# Patient Record
Sex: MALE | URBAN - METROPOLITAN AREA
[De-identification: names, ages, dates, MRNs, and addresses within clinical notes are randomized per-mention and may not be internally consistent; named-entity substitution may affect disease eponyms.]

---

## 2020-06-26 ENCOUNTER — OFFICE VISIT (OUTPATIENT)
Dept: URBAN - METROPOLITAN AREA CLINIC 16 | Facility: CLINIC | Age: 40
End: 2020-06-26
Payer: COMMERCIAL

## 2020-06-26 DIAGNOSIS — H25.043 POSTERIOR SUBCAPSULAR POLAR AGE-RELATED CATARACT, BILATERAL: Primary | ICD-10-CM

## 2020-06-26 PROCEDURE — 99204 OFFICE O/P NEW MOD 45 MIN: CPT | Performed by: OPHTHALMOLOGY

## 2020-06-26 ASSESSMENT — VISUAL ACUITY
OD: 20/25
OS: 20/25

## 2020-06-26 ASSESSMENT — INTRAOCULAR PRESSURE
OD: 17
OS: 17

## 2020-06-26 ASSESSMENT — KERATOMETRY
OS: 44.88
OD: 44.75

## 2020-06-26 NOTE — IMPRESSION/PLAN
Impression: Posterior subcapsular polar age-related cataract, bilateral: H25.043. Alvenia Bash Visually significant, quality of life issue, could improve with surgery. Plan: Cataracts account for the patient's complaints. Discussed all risks, benefits, alternatives, procedures and recovery. Patient understands changing glasses will not improve vision. Patient desires to have surgery, recommend phacoemulsification with intraocular lens implant OS. lvl 2 - pt considering toric IOL -  Re-evaluate OD for possible cataract surgery after OS is done.

## 2023-09-22 ENCOUNTER — OFFICE VISIT (OUTPATIENT)
Dept: URBAN - METROPOLITAN AREA CLINIC 16 | Facility: CLINIC | Age: 43
End: 2023-09-22
Payer: COMMERCIAL

## 2023-09-22 DIAGNOSIS — H25.043 POSTERIOR SUBCAPSULAR POLAR AGE-RELATED CATARACT, BILATERAL: Primary | ICD-10-CM

## 2023-09-22 PROCEDURE — 99204 OFFICE O/P NEW MOD 45 MIN: CPT | Performed by: OPHTHALMOLOGY

## 2023-09-22 ASSESSMENT — VISUAL ACUITY
OS: 20/25
OD: 20/25

## 2023-09-22 ASSESSMENT — INTRAOCULAR PRESSURE
OD: 15
OS: 15

## 2023-10-25 ENCOUNTER — TECH ONLY (OUTPATIENT)
Dept: URBAN - METROPOLITAN AREA CLINIC 16 | Facility: CLINIC | Age: 43
End: 2023-10-25
Payer: COMMERCIAL

## 2023-10-25 DIAGNOSIS — H25.043 POSTERIOR SUBCAPSULAR POLAR AGE-RELATED CATARACT, BILATERAL: Primary | ICD-10-CM

## 2023-10-25 ASSESSMENT — PACHYMETRY
OD: 23.85
OD: 3.16
OS: 3.05
OS: 23.77

## 2023-10-30 ENCOUNTER — SURGERY (OUTPATIENT)
Dept: URBAN - METROPOLITAN AREA SURGERY 11 | Facility: SURGERY | Age: 43
End: 2023-10-30
Payer: COMMERCIAL

## 2023-10-30 PROCEDURE — 66984 XCAPSL CTRC RMVL W/O ECP: CPT | Performed by: OPHTHALMOLOGY

## 2023-10-31 ENCOUNTER — POST-OPERATIVE VISIT (OUTPATIENT)
Dept: URBAN - METROPOLITAN AREA CLINIC 16 | Facility: CLINIC | Age: 43
End: 2023-10-31

## 2023-10-31 DIAGNOSIS — Z48.810 ENCOUNTER FOR SURGICAL AFTERCARE FOLLOWING SURGERY ON A SENSE ORGAN: Primary | ICD-10-CM

## 2023-10-31 PROCEDURE — 99024 POSTOP FOLLOW-UP VISIT: CPT | Performed by: OPTOMETRIST

## 2023-10-31 ASSESSMENT — INTRAOCULAR PRESSURE
OD: 14
OS: 19

## 2023-11-06 ENCOUNTER — POST-OPERATIVE VISIT (OUTPATIENT)
Dept: URBAN - METROPOLITAN AREA CLINIC 16 | Facility: CLINIC | Age: 43
End: 2023-11-06

## 2023-11-06 DIAGNOSIS — Z48.810 ENCOUNTER FOR SURGICAL AFTERCARE FOLLOWING SURGERY ON A SENSE ORGAN: Primary | ICD-10-CM

## 2023-11-06 PROCEDURE — 99024 POSTOP FOLLOW-UP VISIT: CPT | Performed by: OPTOMETRIST

## 2023-11-06 ASSESSMENT — INTRAOCULAR PRESSURE: OS: 17

## 2023-11-20 ENCOUNTER — SURGERY (OUTPATIENT)
Dept: URBAN - METROPOLITAN AREA SURGERY 11 | Facility: SURGERY | Age: 43
End: 2023-11-20
Payer: COMMERCIAL

## 2023-11-20 PROCEDURE — 66984 XCAPSL CTRC RMVL W/O ECP: CPT | Performed by: OPHTHALMOLOGY

## 2023-11-21 ENCOUNTER — Encounter (OUTPATIENT)
Dept: URBAN - METROPOLITAN AREA CLINIC 16 | Facility: CLINIC | Age: 43
End: 2023-11-21

## 2023-11-21 PROCEDURE — 99024 POSTOP FOLLOW-UP VISIT: CPT | Performed by: OPTOMETRIST

## 2023-11-28 ENCOUNTER — POST-OPERATIVE VISIT (OUTPATIENT)
Dept: URBAN - METROPOLITAN AREA CLINIC 16 | Facility: CLINIC | Age: 43
End: 2023-11-28

## 2023-11-28 DIAGNOSIS — Z96.1 PRESENCE OF INTRAOCULAR LENS: Primary | ICD-10-CM

## 2023-11-28 PROCEDURE — 99024 POSTOP FOLLOW-UP VISIT: CPT | Performed by: OPTOMETRIST

## 2023-11-28 ASSESSMENT — INTRAOCULAR PRESSURE
OS: 17
OD: 14

## 2024-02-26 ENCOUNTER — OFFICE VISIT (OUTPATIENT)
Dept: URBAN - METROPOLITAN AREA CLINIC 41 | Facility: CLINIC | Age: 44
End: 2024-02-26
Payer: COMMERCIAL

## 2024-02-26 DIAGNOSIS — H43.812 VITREOUS DEGENERATION, LEFT EYE: ICD-10-CM

## 2024-02-26 DIAGNOSIS — Z96.1 PRESENCE OF INTRAOCULAR LENS: ICD-10-CM

## 2024-02-26 DIAGNOSIS — H43.11 VITREOUS HEMORRHAGE, RIGHT EYE: Primary | ICD-10-CM

## 2024-02-26 PROCEDURE — 92134 CPTRZ OPH DX IMG PST SGM RTA: CPT | Performed by: OPHTHALMOLOGY

## 2024-02-26 PROCEDURE — 99205 OFFICE O/P NEW HI 60 MIN: CPT | Performed by: OPHTHALMOLOGY

## 2024-02-26 RX ORDER — MOXIFLOXACIN 5 MG/ML
0.5 % SOLUTION/ DROPS OPHTHALMIC
Qty: 5 | Refills: 3 | Status: ACTIVE
Start: 2024-02-26

## 2024-02-26 RX ORDER — PREDNISOLONE ACETATE 10 MG/ML
1 % SUSPENSION/ DROPS OPHTHALMIC
Qty: 5 | Refills: 3 | Status: ACTIVE
Start: 2024-02-26

## 2024-02-26 ASSESSMENT — INTRAOCULAR PRESSURE
OS: 11
OD: 15

## 2024-02-28 ENCOUNTER — POST-OPERATIVE VISIT (OUTPATIENT)
Dept: URBAN - METROPOLITAN AREA CLINIC 41 | Facility: CLINIC | Age: 44
End: 2024-02-28
Payer: COMMERCIAL

## 2024-02-28 PROCEDURE — 99024 POSTOP FOLLOW-UP VISIT: CPT | Performed by: STUDENT IN AN ORGANIZED HEALTH CARE EDUCATION/TRAINING PROGRAM

## 2024-02-28 ASSESSMENT — INTRAOCULAR PRESSURE
OS: 16
OD: 18